# Patient Record
Sex: FEMALE | Race: WHITE | NOT HISPANIC OR LATINO | Employment: FULL TIME | URBAN - METROPOLITAN AREA
[De-identification: names, ages, dates, MRNs, and addresses within clinical notes are randomized per-mention and may not be internally consistent; named-entity substitution may affect disease eponyms.]

---

## 2017-11-27 ENCOUNTER — OFFICE VISIT (OUTPATIENT)
Dept: OPHTHALMOLOGY | Facility: CLINIC | Age: 57
End: 2017-11-27
Payer: COMMERCIAL

## 2017-11-27 DIAGNOSIS — B39.9 PRESUMED OCULAR HISTOPLASMOSIS SYNDROME OF BOTH EYES: Primary | ICD-10-CM

## 2017-11-27 DIAGNOSIS — Z13.5 SCREENING FOR GLAUCOMA: ICD-10-CM

## 2017-11-27 DIAGNOSIS — H32 PRESUMED OCULAR HISTOPLASMOSIS SYNDROME OF BOTH EYES: Primary | ICD-10-CM

## 2017-11-27 DIAGNOSIS — H52.13 HIGH MYOPIA, BILATERAL: ICD-10-CM

## 2017-11-27 PROCEDURE — 92014 COMPRE OPH EXAM EST PT 1/>: CPT | Mod: S$GLB,,, | Performed by: OPTOMETRIST

## 2017-11-27 PROCEDURE — 92250 FUNDUS PHOTOGRAPHY W/I&R: CPT | Mod: S$GLB,,, | Performed by: OPTOMETRIST

## 2017-11-27 PROCEDURE — 92285 EXTERNAL OCULAR PHOTOGRAPHY: CPT | Mod: S$GLB,,, | Performed by: OPTOMETRIST

## 2017-11-27 PROCEDURE — 99999 PR PBB SHADOW E&M-EST. PATIENT-LVL II: CPT | Mod: PBBFAC,,, | Performed by: OPTOMETRIST

## 2017-11-27 PROCEDURE — 92015 DETERMINE REFRACTIVE STATE: CPT | Mod: S$GLB,,, | Performed by: OPTOMETRIST

## 2017-11-27 RX ORDER — TRAZODONE HYDROCHLORIDE 150 MG/1
300 TABLET ORAL
COMMUNITY
Start: 2017-06-27

## 2017-11-27 RX ORDER — MONTELUKAST SODIUM 10 MG/1
10 TABLET ORAL
COMMUNITY
Start: 2017-09-13 | End: 2018-09-13

## 2017-11-27 RX ORDER — FLUTICASONE PROPIONATE 110 UG/1
2 AEROSOL, METERED RESPIRATORY (INHALATION)
COMMUNITY
Start: 2017-09-13

## 2017-11-27 RX ORDER — DULOXETIN HYDROCHLORIDE 60 MG/1
CAPSULE, DELAYED RELEASE ORAL
COMMUNITY
Start: 2017-07-12

## 2017-11-27 RX ORDER — GABAPENTIN 300 MG/1
CAPSULE ORAL
COMMUNITY
Start: 2017-05-30

## 2017-11-27 NOTE — PROGRESS NOTES
HPI     Yearly Eye Exam  Problem with near VA with present glass  Interested in DWCL Bifocal  Not wearing GP CL often  Last exam 11/22//16 with [facility]  History of POH both eyes.    Discontinued rigid gas permeable contacts 1 year ago.    Last edited by Britany Crow, OD on 11/27/2017  5:29 PM. (History)            Assessment /Plan     For exam results, see Encounter Report.    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  Optos photos taken.  Glaucoma screening negative and normal external examination.  Stable refractive error.  Updated glasses prescription.  Will order daily disposable multifocal trials.  Once they have arrived, text to patient at [phone] to schedule contact lens fitting appt.  Trials:  Dailies Total 1, Biotrue 1 Day for presbyopia, Proclear 1 Day Multifocal, 1 Day Av Moist    O.D.  -7.50 / O.S. -9.00 with a High or +2.50 add.

## 2018-08-08 ENCOUNTER — OFFICE VISIT (OUTPATIENT)
Dept: OPHTHALMOLOGY | Facility: CLINIC | Age: 58
End: 2018-08-08
Payer: COMMERCIAL

## 2018-08-08 DIAGNOSIS — Z46.0 CONTACT LENS/GLASSES FITTING: Primary | ICD-10-CM

## 2018-08-08 PROCEDURE — 99999 PR PBB SHADOW E&M-EST. PATIENT-LVL II: CPT | Mod: PBBFAC,,, | Performed by: OPTOMETRIST

## 2018-08-08 PROCEDURE — 99499 UNLISTED E&M SERVICE: CPT | Mod: S$GLB,,, | Performed by: OPTOMETRIST

## 2018-08-08 NOTE — PROGRESS NOTES
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 (History)            Assessment /Plan     For exam results, see Encounter Report.    Contact lens/glasses fitting      Overminused in last specs.  Remake specs with today's Rx.  Check into her "lost" CL Order and send her back to [facility] for a refit.                 "

## 2019-02-18 ENCOUNTER — OFFICE VISIT (OUTPATIENT)
Dept: OPHTHALMOLOGY | Facility: CLINIC | Age: 59
End: 2019-02-18
Payer: COMMERCIAL

## 2019-02-18 DIAGNOSIS — Z46.0 CONTACT LENS/GLASSES FITTING: Primary | ICD-10-CM

## 2019-02-18 PROBLEM — F32.5 MAJOR DEPRESSIVE DISORDER WITH SINGLE EPISODE, IN FULL REMISSION: Status: ACTIVE | Noted: 2017-03-15

## 2019-02-18 PROBLEM — I10 ESSENTIAL HYPERTENSION: Status: ACTIVE | Noted: 2018-06-27

## 2019-02-18 PROBLEM — R73.02 GLUCOSE INTOLERANCE (IMPAIRED GLUCOSE TOLERANCE): Status: ACTIVE | Noted: 2018-06-06

## 2019-02-18 PROCEDURE — 99499 UNLISTED E&M SERVICE: CPT | Mod: S$GLB,,, | Performed by: OPTOMETRIST

## 2019-02-18 PROCEDURE — 99499 NO LOS: ICD-10-PCS | Mod: S$GLB,,, | Performed by: OPTOMETRIST

## 2019-02-18 PROCEDURE — 99999 PR PBB SHADOW E&M-EST. PATIENT-LVL II: ICD-10-PCS | Mod: PBBFAC,,, | Performed by: OPTOMETRIST

## 2019-02-18 PROCEDURE — 99999 PR PBB SHADOW E&M-EST. PATIENT-LVL II: CPT | Mod: PBBFAC,,, | Performed by: OPTOMETRIST

## 2019-02-18 NOTE — PROGRESS NOTES
HPI     Last seen by [facility] on 8/8/18 for yearly eye exam  Patient here today for CTL fit  *We discussed daily disposable soft lenses last year.  Her eyes became too   dry to wear RGP lenses.   I was to order trial lenses, but they were never   ordered.  No noticeable changes in vision since last eye exam  Wore RGP lens 4 years ago  No other complaints  No drops  Wears PAL glasses full-time    Last edited by Britany Crow, OD on 2/18/2019  4:47 PM. (History)            Assessment /Plan     For exam results, see Encounter Report.    Contact lens/glasses fitting      Order trial daily disposable trial multifocal lenses:  Dailies Aqua comfort plus Multifocal  Biotrue 1 Day for Presbyopia  Proclear 1 Day Multifocal  1-Day Acuvue Moist Multifocal    All lenses are to be -7.00 both eyes with med and high adds.  Schedule CLFIT once all lenses arrive.

## 2019-03-11 ENCOUNTER — OFFICE VISIT (OUTPATIENT)
Dept: OPHTHALMOLOGY | Facility: CLINIC | Age: 59
End: 2019-03-11
Payer: COMMERCIAL

## 2019-03-11 DIAGNOSIS — H52.13 HIGH MYOPIA, BILATERAL: Primary | ICD-10-CM

## 2019-03-11 PROCEDURE — 99499 NO LOS: ICD-10-PCS | Mod: S$GLB,,, | Performed by: OPTOMETRIST

## 2019-03-11 PROCEDURE — 99999 PR PBB SHADOW E&M-EST. PATIENT-LVL II: CPT | Mod: PBBFAC,,, | Performed by: OPTOMETRIST

## 2019-03-11 PROCEDURE — 99499 UNLISTED E&M SERVICE: CPT | Mod: S$GLB,,, | Performed by: OPTOMETRIST

## 2019-03-11 PROCEDURE — 99999 PR PBB SHADOW E&M-EST. PATIENT-LVL II: ICD-10-PCS | Mod: PBBFAC,,, | Performed by: OPTOMETRIST

## 2019-03-11 NOTE — PROGRESS NOTES
HPI     Last seen by [facility] on 2/18/19 for yearly eye exam  Patient here today to try CTL  Vision stable  No changes      Last edited by Le Schmidt, PCT on 3/11/2019  3:28 PM. (History)              Assessment /Plan     For exam results, see Encounter Report.    High myopia, bilateral      Dispensed 3 sets of trial multifocals.  Ms. Slaughter will let me know if any of them work.

## 2019-12-02 ENCOUNTER — OFFICE VISIT (OUTPATIENT)
Dept: OPHTHALMOLOGY | Facility: CLINIC | Age: 59
End: 2019-12-02
Payer: COMMERCIAL

## 2019-12-02 DIAGNOSIS — B39.9 PRESUMED OCULAR HISTOPLASMOSIS SYNDROME (POHS) OF RIGHT EYE: Primary | ICD-10-CM

## 2019-12-02 DIAGNOSIS — H52.13 HIGH MYOPIA, BILATERAL: ICD-10-CM

## 2019-12-02 DIAGNOSIS — H32 PRESUMED OCULAR HISTOPLASMOSIS SYNDROME (POHS) OF RIGHT EYE: Primary | ICD-10-CM

## 2019-12-02 DIAGNOSIS — Z13.5 GLAUCOMA SCREENING: ICD-10-CM

## 2019-12-02 PROCEDURE — 92250 FUNDUS PHOTOGRAPHY W/I&R: CPT | Mod: S$GLB,,, | Performed by: OPTOMETRIST

## 2019-12-02 PROCEDURE — 92014 COMPRE OPH EXAM EST PT 1/>: CPT | Mod: S$GLB,,, | Performed by: OPTOMETRIST

## 2019-12-02 PROCEDURE — 92015 PR REFRACTION: ICD-10-PCS | Mod: S$GLB,,, | Performed by: OPTOMETRIST

## 2019-12-02 PROCEDURE — 92015 DETERMINE REFRACTIVE STATE: CPT | Mod: S$GLB,,, | Performed by: OPTOMETRIST

## 2019-12-02 PROCEDURE — 99999 PR PBB SHADOW E&M-EST. PATIENT-LVL II: ICD-10-PCS | Mod: PBBFAC,,, | Performed by: OPTOMETRIST

## 2019-12-02 PROCEDURE — 92250 COLOR FUNDUS PHOTOGRAPHY - OU - BOTH EYES: ICD-10-PCS | Mod: S$GLB,,, | Performed by: OPTOMETRIST

## 2019-12-02 PROCEDURE — 92014 PR EYE EXAM, EST PATIENT,COMPREHESV: ICD-10-PCS | Mod: S$GLB,,, | Performed by: OPTOMETRIST

## 2019-12-02 PROCEDURE — 99999 PR PBB SHADOW E&M-EST. PATIENT-LVL II: CPT | Mod: PBBFAC,,, | Performed by: OPTOMETRIST

## 2019-12-02 NOTE — PROGRESS NOTES
HPI     Eye Exam      Additional comments: Yearly              Comments     [place] [facility] [place]11/19 for CTL follow up  Patient here today for yearly eye exam  No noticeable changes in vision since last eye exam  Wears PAL glasses full-time   No other complaints  No drops  Has trial pair of CTL does not wear them much  *Vision better for driving and other distance tasks in last year's   original prescription.  Near vision better in most recent prescription.    Changes glasses throughout the day.  Tried contacts and found that the left lens was uncomfortable in both   pair.  Will try them again over [place] school break and let me know   what she would like to do.          Last edited by Britany Crow, OD on 12/2/2019  4:18 PM. (History)            Assessment /Plan     For exam results, see Encounter Report.    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  Glaucoma screening negative  Updated glasses prescription.  Will update contact lens prescription once patient tries the lenses again.  Return to clinic 1 yr.

## 2020-12-03 ENCOUNTER — OFFICE VISIT (OUTPATIENT)
Dept: OPHTHALMOLOGY | Facility: CLINIC | Age: 60
End: 2020-12-03
Payer: COMMERCIAL

## 2020-12-03 DIAGNOSIS — H52.4 MYOPIA WITH ASTIGMATISM AND PRESBYOPIA, BILATERAL: ICD-10-CM

## 2020-12-03 DIAGNOSIS — B39.9 PRESUMED OCULAR HISTOPLASMOSIS SYNDROME (POHS) OF RIGHT EYE: ICD-10-CM

## 2020-12-03 DIAGNOSIS — H52.13 MYOPIA WITH ASTIGMATISM AND PRESBYOPIA, BILATERAL: ICD-10-CM

## 2020-12-03 DIAGNOSIS — H52.203 MYOPIA WITH ASTIGMATISM AND PRESBYOPIA, BILATERAL: ICD-10-CM

## 2020-12-03 DIAGNOSIS — G35 MS (MULTIPLE SCLEROSIS): Primary | ICD-10-CM

## 2020-12-03 DIAGNOSIS — R73.02 GLUCOSE INTOLERANCE (IMPAIRED GLUCOSE TOLERANCE): ICD-10-CM

## 2020-12-03 DIAGNOSIS — H32 PRESUMED OCULAR HISTOPLASMOSIS SYNDROME (POHS) OF RIGHT EYE: ICD-10-CM

## 2020-12-03 PROCEDURE — 92015 PR REFRACTION: ICD-10-PCS | Mod: S$GLB,,, | Performed by: OPTOMETRIST

## 2020-12-03 PROCEDURE — 99999 PR PBB SHADOW E&M-EST. PATIENT-LVL III: ICD-10-PCS | Mod: PBBFAC,,, | Performed by: OPTOMETRIST

## 2020-12-03 PROCEDURE — 92014 PR EYE EXAM, EST PATIENT,COMPREHESV: ICD-10-PCS | Mod: S$GLB,,, | Performed by: OPTOMETRIST

## 2020-12-03 PROCEDURE — 92014 COMPRE OPH EXAM EST PT 1/>: CPT | Mod: S$GLB,,, | Performed by: OPTOMETRIST

## 2020-12-03 PROCEDURE — 92015 DETERMINE REFRACTIVE STATE: CPT | Mod: S$GLB,,, | Performed by: OPTOMETRIST

## 2020-12-03 PROCEDURE — 99999 PR PBB SHADOW E&M-EST. PATIENT-LVL III: CPT | Mod: PBBFAC,,, | Performed by: OPTOMETRIST

## 2020-12-03 RX ORDER — HYDROCORTISONE 25 MG/G
CREAM TOPICAL
COMMUNITY
Start: 2020-05-26 | End: 2021-05-26

## 2020-12-03 RX ORDER — BETAMETHASONE DIPROPIONATE 0.5 MG/G
CREAM TOPICAL
COMMUNITY
Start: 2020-05-26

## 2020-12-03 RX ORDER — LISINOPRIL 5 MG/1
5 TABLET ORAL
COMMUNITY
Start: 2020-09-18 | End: 2021-09-18

## 2020-12-03 RX ORDER — PRAVASTATIN SODIUM 40 MG/1
40 TABLET ORAL
COMMUNITY
Start: 2020-09-18 | End: 2021-09-18

## 2020-12-03 RX ORDER — PREGABALIN 150 MG/1
CAPSULE ORAL
COMMUNITY
Start: 2020-11-06

## 2020-12-03 RX ORDER — MONTELUKAST SODIUM 10 MG/1
TABLET ORAL
COMMUNITY
Start: 2020-02-24

## 2020-12-03 NOTE — PROGRESS NOTES
HPI     Any vision changes since last exam: no   Eye pain: no  Other ocular symptoms: no    Do you wear currently wear glasses or contacts? glasses    Interested in contacts today? yes    Do you plan on getting new glasses today? If needed            Last edited by Aurora Ray on 12/3/2020  3:56 PM. (History)            Assessment /Plan     For exam results, see Encounter Report.    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   Discussed above and answered questions.

## 2022-07-12 ENCOUNTER — TELEPHONE (OUTPATIENT)
Dept: OPHTHALMOLOGY | Facility: CLINIC | Age: 62
End: 2022-07-12
Payer: COMMERCIAL

## 2022-07-12 NOTE — TELEPHONE ENCOUNTER
----- Message from Elliot Daily sent at 7/12/2022  8:53 AM CDT -----  Contact: [phone]  Pt is calling regarding appt . Please call back at [phone]  . Thanks/dj

## 2022-08-09 ENCOUNTER — OFFICE VISIT (OUTPATIENT)
Dept: OPHTHALMOLOGY | Facility: CLINIC | Age: 62
End: 2022-08-09
Payer: COMMERCIAL

## 2022-08-09 DIAGNOSIS — H52.203 MYOPIA WITH ASTIGMATISM AND PRESBYOPIA, BILATERAL: ICD-10-CM

## 2022-08-09 DIAGNOSIS — H52.13 MYOPIA WITH ASTIGMATISM AND PRESBYOPIA, BILATERAL: ICD-10-CM

## 2022-08-09 DIAGNOSIS — H32 PRESUMED OCULAR HISTOPLASMOSIS SYNDROME (POHS) OF RIGHT EYE: ICD-10-CM

## 2022-08-09 DIAGNOSIS — B39.9 PRESUMED OCULAR HISTOPLASMOSIS SYNDROME (POHS) OF RIGHT EYE: ICD-10-CM

## 2022-08-09 DIAGNOSIS — G35 MS (MULTIPLE SCLEROSIS): Primary | ICD-10-CM

## 2022-08-09 DIAGNOSIS — H52.4 MYOPIA WITH ASTIGMATISM AND PRESBYOPIA, BILATERAL: ICD-10-CM

## 2022-08-09 PROCEDURE — 92014 PR EYE EXAM, EST PATIENT,COMPREHESV: ICD-10-PCS | Mod: S$GLB,,, | Performed by: OPTOMETRIST

## 2022-08-09 PROCEDURE — 99999 PR PBB SHADOW E&M-EST. PATIENT-LVL III: CPT | Mod: PBBFAC,,, | Performed by: OPTOMETRIST

## 2022-08-09 PROCEDURE — 92014 COMPRE OPH EXAM EST PT 1/>: CPT | Mod: S$GLB,,, | Performed by: OPTOMETRIST

## 2022-08-09 PROCEDURE — 92015 PR REFRACTION: ICD-10-PCS | Mod: S$GLB,,, | Performed by: OPTOMETRIST

## 2022-08-09 PROCEDURE — 99999 PR PBB SHADOW E&M-EST. PATIENT-LVL III: ICD-10-PCS | Mod: PBBFAC,,, | Performed by: OPTOMETRIST

## 2022-08-09 PROCEDURE — 92015 DETERMINE REFRACTIVE STATE: CPT | Mod: S$GLB,,, | Performed by: OPTOMETRIST

## 2022-08-09 RX ORDER — DIPHENHYDRAMINE HCL 25 MG
25 CAPSULE ORAL EVERY 6 HOURS PRN
COMMUNITY

## 2022-08-09 RX ORDER — EPINEPHRINE 0.3 MG/.3ML
INJECTION SUBCUTANEOUS
COMMUNITY
Start: 2022-04-21

## 2022-08-09 RX ORDER — MUPIROCIN 20 MG/G
OINTMENT TOPICAL
COMMUNITY

## 2022-08-09 RX ORDER — POTASSIUM CHLORIDE 750 MG/1
20 CAPSULE, EXTENDED RELEASE ORAL DAILY
COMMUNITY
Start: 2022-06-23

## 2022-08-09 RX ORDER — AMLODIPINE BESYLATE 5 MG/1
5 TABLET ORAL DAILY
COMMUNITY
Start: 2022-08-07

## 2022-08-09 RX ORDER — METOPROLOL SUCCINATE 25 MG/1
25 TABLET, EXTENDED RELEASE ORAL DAILY
COMMUNITY
Start: 2022-08-07

## 2022-08-09 NOTE — PROGRESS NOTES
HPI     Annual Exam     Comments: Patient is here for Annual Yearly Exam. VA stables. No pain or   discomfort. Pt. currently not using any eye gtt.          Last edited by Timbo Loco on 8/9/2022  3:21 PM. (History)            Assessment /Plan     For exam results, see Encounter Report.    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   Discussed above and answered questions.